# Patient Record
Sex: MALE | Race: WHITE | NOT HISPANIC OR LATINO | Employment: OTHER | URBAN - METROPOLITAN AREA
[De-identification: names, ages, dates, MRNs, and addresses within clinical notes are randomized per-mention and may not be internally consistent; named-entity substitution may affect disease eponyms.]

---

## 2018-12-10 ENCOUNTER — TRANSFERRED RECORDS (OUTPATIENT)
Dept: MULTI SPECIALTY CLINIC | Facility: CLINIC | Age: 63
End: 2018-12-10

## 2023-09-21 ENCOUNTER — OFFICE VISIT (OUTPATIENT)
Dept: FAMILY MEDICINE | Facility: OTHER | Age: 68
End: 2023-09-21
Attending: NURSE PRACTITIONER
Payer: MEDICARE

## 2023-09-21 VITALS
HEIGHT: 67 IN | RESPIRATION RATE: 16 BRPM | TEMPERATURE: 95.7 F | SYSTOLIC BLOOD PRESSURE: 157 MMHG | HEART RATE: 51 BPM | WEIGHT: 180 LBS | BODY MASS INDEX: 28.25 KG/M2 | DIASTOLIC BLOOD PRESSURE: 73 MMHG | OXYGEN SATURATION: 96 %

## 2023-09-21 DIAGNOSIS — L08.9 WOUND INFECTION: Primary | ICD-10-CM

## 2023-09-21 DIAGNOSIS — T14.8XXA WOUND INFECTION: Primary | ICD-10-CM

## 2023-09-21 PROCEDURE — G0463 HOSPITAL OUTPT CLINIC VISIT: HCPCS | Mod: 25

## 2023-09-21 PROCEDURE — 87070 CULTURE OTHR SPECIMN AEROBIC: CPT | Mod: ZL

## 2023-09-21 PROCEDURE — 87205 SMEAR GRAM STAIN: CPT | Mod: ZL

## 2023-09-21 PROCEDURE — 99203 OFFICE O/P NEW LOW 30 MIN: CPT | Mod: 25

## 2023-09-21 PROCEDURE — 90471 IMMUNIZATION ADMIN: CPT

## 2023-09-21 PROCEDURE — 90715 TDAP VACCINE 7 YRS/> IM: CPT | Mod: GY

## 2023-09-21 RX ORDER — MUPIROCIN 20 MG/G
OINTMENT TOPICAL 3 TIMES DAILY
Qty: 30 G | Refills: 0 | Status: SHIPPED | OUTPATIENT
Start: 2023-09-21

## 2023-09-21 RX ORDER — SULFAMETHOXAZOLE/TRIMETHOPRIM 800-160 MG
1 TABLET ORAL 2 TIMES DAILY
Qty: 20 TABLET | Refills: 0 | Status: SHIPPED | OUTPATIENT
Start: 2023-09-21 | End: 2023-10-01

## 2023-09-21 ASSESSMENT — PAIN SCALES - GENERAL: PAINLEVEL: NO PAIN (0)

## 2023-09-22 NOTE — PROGRESS NOTES
ASSESSMENT/PLAN:    (T14.8XXA,  L08.9) Wound infection  (primary encounter diagnosis)  Comment: Patient presents to clinic for a wound on the left arm. He had punctured it with a ruperto nail about two weeks ago. He has tried triple antibiotic with neosporin on it but he is allergic to neosporin which caused a reaction. He also tried aloe and bacitracin. The wound is red, feels itchy, and has yellow fluid draining from it.  Exam of the right forearm with open wound, yellow serous drainage, surrounding erythema, with some papules surrounding the wound.  There is concern for infection, however there is also some irritation around the wound, likely from Neosporin.  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  In addition Bactrim was also sent.  He was given an updated Tdap shot today.  Wound culture was also obtained and is pending.  Wound was dressed with a Telfa and 2 x 2 gauze.  Plan: Wound Aerobic Bacterial Culture Routine with         Gram Stain, mupirocin (BACTROBAN) 2 % external         ointment, sulfamethoxazole-trimethoprim         (BACTRIM DS) 800-160 MG tablet, TDAP 7+         (ADACEL,BOOSTRIX)  Take antibiotic as ordered.  You may apply the mupirocin cream as well.  Follow-up if symptoms are worsening, you develop any fevers, chills, or have other concerns.  Wound culture is pending and may take a few days to have results return, we will update you with results.    Discussed warning signs/symptoms indicative of need to f/u    Follow up if symptoms persist or worsen or concerns    I have reviewed the nursing notes.  I have reviewed the findings, diagnosis, plan and need for follow up with the patient.    I explained my diagnostic considerations and recommendations to the patient, who voiced understanding and agreement with the treatment plan. All questions were answered. We discussed potential side effects of any prescribed or recommended therapies, as well as  expectations for response to treatments.    ALEX DE LA TORRE, REZA CNP  9/21/2023  7:24 PM    HPI:    Aurelio Lopez is a 68 year old male  who presents to [facility] today for concerns of wound to arm.     Patient presents to clinic for a wound on the left arm. He had punctured it with a ruperto nail about two weeks ago. He has tried triple antibiotic with neosporin on it but he is allergic to neosporin which caused a reaction. He also tried aloe and bacitracin. The wound is red, feels itchy, and has yellow fluid draining from it.      Last tetanus was in 2013.    No past medical history on file.  Past Surgical History:   Procedure Laterality Date    AORTIC VALVE REPLACEMENT      CARDIAC SURGERY      OPEN REDUCTION SHOULDER DISLOCATION Left     ZZC TOTAL KNEE ARTHROPLASTY Right 11/21/2016    Procedure: RIGHT TOTAL KNEE ARTHROPLASTY;  Surgeon: Marvin Diaz MD;  Location: [facility];  Service: Orthopedics     Social History     Tobacco Use    Smoking status: Former    Smokeless tobacco: Not on file   Substance Use Topics    Alcohol use: Yes     Alcohol/week: 2.0 standard drinks of alcohol     Comment: Alcoholic Drinks/day: weekly     Current Outpatient Medications   Medication Sig Dispense Refill    amLODIPine (NORVASC) 5 MG tablet [AMLODIPINE (NORVASC) 5 MG TABLET] Take 5 mg by mouth daily.      fluticasone (FLONASE) 50 mcg/actuation nasal spray [FLUTICASONE (FLONASE) 50 MCG/ACTUATION NASAL SPRAY] 1-2 sprays into each nostril daily as needed for rhinitis.      GLUC HCL/CSA/COLLAGEN/HYALUR A (ADTIYTDU-STFC-FTNZNY-HYALUR AC ORAL) [GLUC HCL/CSA/COLLAGEN/HYALUR A (IQUUIWNM-KPXO-PNPSSQ-HYALUR AC ORAL)] Take 1 capsule by mouth daily.      LORazepam (ATIVAN) 1 MG tablet [LORAZEPAM (ATIVAN) 1 MG TABLET] Take 0.5 mg by mouth every 8 (eight) hours as needed for anxiety.      rivaroxaban (XARELTO) 10 mg tablet [RIVAROXABAN (XARELTO) 10 MG TABLET] Take 1 tablet (10 mg total) by mouth daily. 14 tablet 0     "sertraline (ZOLOFT) 25 MG tablet [SERTRALINE (ZOLOFT) 25 MG TABLET] Take 25 mg by mouth 3 (three) times a week. (on MWF)      traZODone (DESYREL) 50 MG tablet [TRAZODONE (DESYREL) 50 MG TABLET] Take 50 mg by mouth bedtime as needed for sleep.      valsartan-hydrochlorothiazide (DIOVAN-HCT) 320-25 mg per tablet [VALSARTAN-HYDROCHLOROTHIAZIDE (DIOVAN-HCT) 320-25 MG PER TABLET] Take 1 tablet by mouth daily.      atorvastatin (LIPITOR) 20 MG tablet [ATORVASTATIN (LIPITOR) 20 MG TABLET] Take 10 mg by mouth 2 (two) times a week. (on Mon and Thurs) (Patient not taking: Reported on 9/21/2023)      metoprolol succinate (TOPROL-XL) 25 MG [METOPROLOL SUCCINATE (TOPROL-XL) 25 MG] Take 12.5 mg by mouth daily. (Patient not taking: Reported on 9/21/2023)      OMEGA-3/DHA/EPA/FISH OIL (FISH OIL-OMEGA-3 FATTY ACIDS) 300-1,000 mg capsule [OMEGA-3/DHA/EPA/FISH OIL (FISH OIL-OMEGA-3 FATTY ACIDS) 300-1,000 MG CAPSULE] Take 1 g by mouth daily.  (Patient not taking: Reported on 9/21/2023)      UNABLE TO FIND [UNABLE TO FIND] Take 1 tablet by mouth daily. Med Name: Adan-Max (Patient not taking: Reported on 9/21/2023)       Allergies   Allergen Reactions    Lisinopril Cough    Neomycin Rash     Past medical history, past surgical history, current medications and allergies reviewed and accurate to the best of my knowledge.      ROS:  Refer to HPI    BP (!) 157/73   Pulse 51   Temp (!) 95.7  F (35.4  C) (Tympanic)   Resp 16   Ht 1.702 m (5' 7\")   Wt 81.6 kg (180 lb)   SpO2 96%   BMI 28.19 kg/m      EXAM:  General Appearance: Well appearing 68 year old male, appropriate appearance for age. No acute distress   Ears: Left TM intact, translucent with bony landmarks appreciated, no erythema, no effusion, no bulging, no purulence.  Right TM intact, translucent with bony landmarks appreciated, no erythema, no effusion, no bulging, no purulence.  Left auditory canal clear.  Right auditory canal clear.  Normal external ears, non tender.  Eyes: " conjunctivae normal without erythema or irritation, corneas clear, no drainage or crusting, no eyelid swelling, pupils equal   Oropharynx: moist mucous membranes, voice clear.    Sinuses:  No sinus tenderness upon palpation of the frontal or maxillary sinuses  Nose:  Bilateral nares: no erythema, no edema, no drainage or congestion   Neck: supple without adenopathy  Respiratory: normal chest wall and respirations.  Normal effort.  Clear to auscultation bilaterally, no wheezing, crackles or rhonchi.  No increased work of breathing.  No cough appreciated.  Cardiac: RRR with no murmurs  Musculoskeletal:  Equal movement of bilateral upper extremities.  Equal movement of bilateral lower extremities.  Normal gait.    Dermatological: Right forearm with open wound, yellow serous drainage, surrounding erythema, with some papules surrounding the wound.   Neuro: Alert and oriented to person, place, and time.  Cranial nerves II-XII grossly intact with no focal or lateralizing deficits.  Muscle tone normal.  Gait normal. No tremor.   Psychological: normal affect, alert, oriented, and pleasant.

## 2023-09-22 NOTE — NURSING NOTE
"Chief Complaint   Patient presents with    Musculoskeletal Problem     Wound from ruperto nail   Patient presents to clinic for a wound on the left arm. He had punctured it with a ruperto nail about two weeks ago. He has tried triple antibiotic with neosporin on it but he is allergic to neosporin which caused a reaction. He also tried aloe and bacitracin. The wound is red, feels itchy, and has yellow fluid draining from it.        Megan Mayers on 9/21/2023 at 7:08 PM        Initial BP (!) 157/73   Pulse 51   Temp (!) 95.7  F (35.4  C) (Tympanic)   Resp 16   Ht 1.702 m (5' 7\")   Wt 81.6 kg (180 lb)   SpO2 96%   BMI 28.19 kg/m   Estimated body mass index is 28.19 kg/m  as calculated from the following:    Height as of this encounter: 1.702 m (5' 7\").    Weight as of this encounter: 81.6 kg (180 lb).       FOOD SECURITY SCREENING QUESTIONS:    The next two questions are to help us understand your food security.  If you are feeling you need any assistance in this area, we have resources available to support you today.    Hunger Vital Signs:  Within the past 12 months we worried whether our food would run out before we got money to buy more. Never  Within the past 12 months the food we bought just didn't last and we didn't have money to get more. Never      Megan Mayers     "

## 2023-09-24 ENCOUNTER — TELEPHONE (OUTPATIENT)
Dept: FAMILY MEDICINE | Facility: OTHER | Age: 68
End: 2023-09-24
Payer: MEDICARE

## 2023-09-24 LAB
BACTERIA WND CULT: NO GROWTH
GRAM STAIN RESULT: NORMAL
GRAM STAIN RESULT: NORMAL

## 2023-09-24 NOTE — TELEPHONE ENCOUNTER
Patient was notified, and verbalized understanding. No further questions at this time.   Katie Mirza LPN on 9/24/2023 at 1:12 PM

## 2024-06-20 ENCOUNTER — TRANSFERRED RECORDS (OUTPATIENT)
Dept: FAMILY MEDICINE | Facility: OTHER | Age: 69
End: 2024-06-20
Payer: MEDICARE

## 2024-06-20 VITALS — SYSTOLIC BLOOD PRESSURE: 122 MMHG | DIASTOLIC BLOOD PRESSURE: 64 MMHG

## 2024-06-20 NOTE — PROGRESS NOTES
Patient Quality Outreach    Patient is due for the following:   Hypertension -  BP check    Next Steps:   No follow up needed at this time.    Type of outreach:    Chart review performed, no outreach needed. Blood pressure of 122/64 taken at recent office visit at [facility]. Flowsheets updated.    Questions for provider review:    None           Stacy Zapata RN